# Patient Record
Sex: MALE | Race: BLACK OR AFRICAN AMERICAN | NOT HISPANIC OR LATINO | Employment: FULL TIME | URBAN - METROPOLITAN AREA
[De-identification: names, ages, dates, MRNs, and addresses within clinical notes are randomized per-mention and may not be internally consistent; named-entity substitution may affect disease eponyms.]

---

## 2017-11-20 ENCOUNTER — AMBULATORY - HEALTHEAST (OUTPATIENT)
Dept: LAB | Facility: CLINIC | Age: 37
End: 2017-11-20

## 2017-11-20 DIAGNOSIS — Z31.41 FERTILITY TESTING: ICD-10-CM

## 2017-11-21 ENCOUNTER — RECORDS - HEALTHEAST (OUTPATIENT)
Dept: ADMINISTRATIVE | Facility: OTHER | Age: 37
End: 2017-11-21

## 2022-07-12 ENCOUNTER — OFFICE VISIT (OUTPATIENT)
Dept: FAMILY MEDICINE | Facility: CLINIC | Age: 42
End: 2022-07-12

## 2022-07-12 VITALS
HEART RATE: 87 BPM | SYSTOLIC BLOOD PRESSURE: 132 MMHG | OXYGEN SATURATION: 98 % | WEIGHT: 187 LBS | HEIGHT: 69 IN | TEMPERATURE: 98.7 F | DIASTOLIC BLOOD PRESSURE: 84 MMHG | RESPIRATION RATE: 16 BRPM | BODY MASS INDEX: 27.7 KG/M2

## 2022-07-12 DIAGNOSIS — R51.9 ACUTE NONINTRACTABLE HEADACHE, UNSPECIFIED HEADACHE TYPE: Primary | ICD-10-CM

## 2022-07-12 PROCEDURE — 99203 OFFICE O/P NEW LOW 30 MIN: CPT | Performed by: FAMILY MEDICINE

## 2022-07-12 RX ORDER — MIRTAZAPINE 30 MG/1
TABLET, FILM COATED ORAL
COMMUNITY
Start: 2022-01-06 | End: 2022-07-12

## 2022-07-12 RX ORDER — ASPIRIN 81 MG/1
81 TABLET, CHEWABLE ORAL DAILY
COMMUNITY

## 2022-07-12 RX ORDER — PREDNISONE 20 MG/1
20 TABLET ORAL 2 TIMES DAILY
Qty: 14 TABLET | Refills: 0 | Status: SHIPPED | OUTPATIENT
Start: 2022-07-12 | End: 2022-07-19

## 2022-07-12 RX ORDER — MIRTAZAPINE 45 MG/1
45 TABLET, FILM COATED ORAL AT BEDTIME
COMMUNITY
Start: 2022-03-14

## 2022-07-12 RX ORDER — DICLOFENAC SODIUM 75 MG/1
75 TABLET, DELAYED RELEASE ORAL 2 TIMES DAILY PRN
Qty: 30 TABLET | Refills: 0 | Status: SHIPPED | OUTPATIENT
Start: 2022-07-12 | End: 2024-01-07

## 2022-07-12 ASSESSMENT — PATIENT HEALTH QUESTIONNAIRE - PHQ9
SUM OF ALL RESPONSES TO PHQ QUESTIONS 1-9: 4
10. IF YOU CHECKED OFF ANY PROBLEMS, HOW DIFFICULT HAVE THESE PROBLEMS MADE IT FOR YOU TO DO YOUR WORK, TAKE CARE OF THINGS AT HOME, OR GET ALONG WITH OTHER PEOPLE: VERY DIFFICULT
SUM OF ALL RESPONSES TO PHQ QUESTIONS 1-9: 4

## 2022-07-12 ASSESSMENT — PAIN SCALES - GENERAL: PAINLEVEL: EXTREME PAIN (8)

## 2022-07-12 NOTE — PROGRESS NOTES
"  Assessment & Plan     Acute nonintractable headache, unspecified headache type  His headache actually sounds migrainous to me.  I have suggested treatment as noted below with prednisone and diclofenac given the duration of his headache.  If not improving I did put in a contingent order for an MRI of the brain and information on how to schedule that.  - predniSONE (DELTASONE) 20 MG tablet; Take 1 tablet (20 mg) by mouth 2 times daily for 7 days  - diclofenac (VOLTAREN) 75 MG EC tablet; Take 1 tablet (75 mg) by mouth 2 times daily as needed for moderate pain  - MR Brain w/o Contrast; Future             BMI:   Estimated body mass index is 27.62 kg/m  as calculated from the following:    Height as of this encounter: 1.753 m (5' 9\").    Weight as of this encounter: 84.8 kg (187 lb).           Return in about 4 weeks (around 8/9/2022) for Pain Recheck.    Leo Vasquez MD  [facility] Parker is a 42 year old, presenting for the following health issues:  Headache      History of Present Illness       Headaches:   Since the patient's last clinic visit, headaches are: worsened  The patient is getting headaches:  Everyday  He is not able to do normal daily activities when he has a migraine.  The patient is taking the following rescue/relief medications:  Tylenol and other   Patient states \"I get only a small amount of relief\" from the rescue/relief medications.   The patient is taking the following medications to prevent migraines:  No medications to prevent migraines  In the past 4 weeks, the patient has gone to an Urgent Care or Emergency Room 0 times times due to headaches.    He eats 2-3 servings of fruits and vegetables daily.He consumes 0 sweetened beverage(s) daily.He exercises with enough effort to increase his heart rate 9 or less minutes per day.  He exercises with enough effort to increase his heart rate 3 or less days per week.   He is taking medications " "regularly.    Today's PHQ-9         PHQ-9 Total Score: 4    PHQ-9 Q9 Thoughts of better off dead/self-harm past 2 weeks :   Not at all    How difficult have these problems made it for you to do your work, take care of things at home, or get along with other people: Very difficult       Patient came in today ostensibly for an electrocardiogram.  He had been seen at [facility] last week and showed me some test results including a thyroid function, lipid panel, and comprehensive metabolic panel on his phone and those results were normal.  He had been seen there because of lightheadedness and a headache.  What he describes to me is a bilateral occipital headache that started about 3 weeks ago.  It seems to worsen if he has something to drink and he is wondering if it is causing increased pressure inside his head.  Headache seems to also increase when he gets less sleep and improve with sleep.  Not clearly any photophobia or phonophobia although he has noted some blurriness to his vision.  Never really had a headache like this before but he does get milder headaches when he is not sleeping well.  Milder headaches when he is not sleeping well.  The current headaches been pretty constant over the last 2 to 3 weeks although it has waxed and waned in severity.  No associated nausea or vomiting.  He was thinking he might need an EKG because he was wondering if there was some abnormal dilation to the veins in his neck as well as a connection between fluid intake and the pressure in his head.    Review of Systems   Constitutional, HEENT, cardiovascular, pulmonary, gi and gu systems are negative, except as otherwise noted.      Objective    /84   Pulse 87   Temp 98.7  F (37.1  C) (Temporal)   Resp 16   Ht 1.753 m (5' 9\")   Wt 84.8 kg (187 lb)   SpO2 98%   BMI 27.62 kg/m    Body mass index is 27.62 kg/m .  Physical Exam   GENERAL: healthy, alert and no distress  EYES: Eyes grossly normal to inspection, " PERRL and conjunctivae and sclerae normal  EYES: fundi-sharp optic disks  HENT: ear canals and TM's normal, nose and mouth without ulcers or lesions  HENT: normal cephalic/atraumatic, ear canals and TM's normal, nose and mouth without ulcers or lesions, oropharynx clear, oral mucous membranes moist and no TMJ tenderness and temporal artery pulses are normal bilaterally  NECK: no adenopathy, no asymmetry, masses, or scars and thyroid normal to palpation  RESP: lungs clear to auscultation - no rales, rhonchi or wheezes  CV: regular rate and rhythm, normal S1 S2, no S3 or S4, no murmur, click or rub, no peripheral edema and peripheral pulses strong  MS: no gross musculoskeletal defects noted, no edema  SKIN: no suspicious lesions or rashes  NEURO: Normal strength and tone, mentation intact and speech normal  NEURO: Normal strength and tone, sensory exam grossly normal, mentation intact, speech normal, cranial nerves 2-12 intact, DTR's normal and symmetric 1+, Romberg normal and gait was normal and finger-nose-finger was normal  PSYCH: mentation appears normal, affect normal/bright                    .  ..

## 2022-07-30 ENCOUNTER — NURSE TRIAGE (OUTPATIENT)
Dept: NURSING | Facility: CLINIC | Age: 42
End: 2022-07-30

## 2022-07-30 NOTE — TELEPHONE ENCOUNTER
Keith calling back with questions about the MRI. FNA advised it would be fine to reschedule itbut could also wait and staff may decide to do it this weekend when he's seen. Keith agreed to going in tomorrow.   Patient voiced understanding and will follow disposition.   Kimmie Butterfield, RN  [facility] Nurse Advisor        Reason for Disposition    General information question, no triage required and triager able to answer question    Additional Information    Negative: [1] Caller is not with the adult (patient) AND [2] reporting urgent symptoms    Negative: Lab result questions    Negative: Medication questions    Negative: Caller can't be reached by phone    Negative: Caller has already spoken to PCP or another triager    Negative: RN needs further essential information from caller in order to complete triage    Negative: Requesting regular office appointment    Negative: [1] Caller requesting NON-URGENT health information AND [2] PCP's office is the best resource    Negative: Health Information question, no triage required and triager able to answer question    Protocols used: INFORMATION ONLY CALL - NO TRIAGE-A-

## 2022-07-30 NOTE — TELEPHONE ENCOUNTER
Reason for Disposition    Systolic BP  >= 180 OR Diastolic >= 110    Additional Information    Negative: Difficult to awaken or acting confused (e.g., disoriented, slurred speech)    Negative: Severe difficulty breathing (e.g., struggling for each breath, speaks in single words)    Negative: [1] Weakness of the face, arm or leg on one side of the body AND [2] new onset    Negative: [1] Numbness (i.e., loss of sensation) of the face, arm or leg on one side of the body AND [2] new onset    Negative: [1] Chest pain lasts > 5 minutes AND [2] history of heart disease  (i.e., heart attack, bypass surgery, angina, angioplasty, CHF)    Negative: [1] Chest pain AND [2] took nitrogylcerin AND [3] pain was not relieved    Negative: Sounds like a life-threatening emergency to the triager    Negative: Symptom is main concern  (e.g., headache, chest pain)    Negative: Low blood pressure is main concern    Negative: [1] Systolic BP  >= 160 OR Diastolic >= 100 AND [2] cardiac or neurologic symptoms (e.g., chest pain, difficulty breathing, unsteady gait, blurred vision)    Negative: [1] Pregnant > 20 weeks (or postpartum < 6 weeks) AND [2] new hand or face swelling    Negative: [1] Pregnant > 20 weeks AND [2] BP Systolic BP  >= 140 OR Diastolic >= 90    Negative: [1] Systolic BP  >= 200 OR Diastolic >= 120  AND [2] having NO cardiac or neurologic symptoms    Negative: [1] Postpartum < 6 weeks AND [2] BP Systolic BP  >= 140 OR Diastolic >= 90    Negative: [1] Systolic BP  >= 180 OR Diastolic >= 110 AND [2] missed most recent dose of blood pressure medication    Protocols used: HIGH BLOOD PRESSURE-A-AH

## 2022-07-30 NOTE — TELEPHONE ENCOUNTER
"Patient reports elevated blood pressure at home \"for weeks.\" Current BP is 155/103 with heart rate of 82.  Symptom: has pressure on his head; says it's not pain; feels like there is something there    Patient took ASA and Amlodipine 5 mg  (friend's medication).    Disposition: Urgent Care (be seen w/i 24 hrs).  Informed closest /WIC is [facility]. Patient was given address and open hours.  Advised to call back w/ questions/concerns.  Patient verbalized understanding.        Arielle Doshi RN/[facility]       "

## 2022-07-31 ENCOUNTER — HOSPITAL ENCOUNTER (EMERGENCY)
Facility: CLINIC | Age: 42
Discharge: HOME | End: 2022-07-31
Attending: STUDENT IN AN ORGANIZED HEALTH CARE EDUCATION/TRAINING PROGRAM | Admitting: STUDENT IN AN ORGANIZED HEALTH CARE EDUCATION/TRAINING PROGRAM

## 2022-07-31 VITALS
OXYGEN SATURATION: 98 % | WEIGHT: 187 LBS | SYSTOLIC BLOOD PRESSURE: 113 MMHG | RESPIRATION RATE: 16 BRPM | BODY MASS INDEX: 27.7 KG/M2 | HEIGHT: 69 IN | HEART RATE: 97 BPM | DIASTOLIC BLOOD PRESSURE: 77 MMHG

## 2022-07-31 DIAGNOSIS — G44.209 TENSION HEADACHE: ICD-10-CM

## 2022-07-31 PROCEDURE — 99283 EMERGENCY DEPT VISIT LOW MDM: CPT

## 2022-07-31 RX ORDER — METOCLOPRAMIDE 5 MG/1
5 TABLET ORAL 2 TIMES DAILY PRN
Qty: 10 TABLET | Refills: 0 | Status: SHIPPED | OUTPATIENT
Start: 2022-07-31

## 2022-07-31 NOTE — ED PROVIDER NOTES
Emergency Department Encounter         FINAL IMPRESSION:  Headache, hypertension        ED COURSE AND MEDICAL DECISION MAKING     9:45 AM The medical student met with the patient, obtained patient history, and performed a physical exam.  10:09 AM I introduced myself to the patient, obtained patient history, performed a physical exam, and discussed plan for ED workup including potential diagnostic laboratory/imaging studies and interventions.  10:18 AM We discussed the plan for discharge and the patient is agreeable. Reviewed supportive cares, symptomatic treatment, outpatient follow up, and reasons to return to the Emergency Department. Patient to be discharged by ED RN.      ED Course as of 07/31/22 1032   Sun Jul 31, 2022   1023 Patient is a 42-year-old male healthy with no chronic medical problems here with high blood pressure and headaches.  Patient was seen by his primary care doctor earlier this month and was prescribed prednisone and diclofenac for headaches.  Was told to schedule MRI.  He patient states he did schedule an MRI however did not follow-up and was unable to go to the appointment.  Presents now with a systolic blood pressure of 150 at home with a posterior occipital headache that is been constant for the past month or so.  No fevers, chills, nausea, vomiting, vision issues, vertigo, chest pain, trouble breathing, abdominal pain, difficulty walking or imbalance issues.  No speech difficulties.  No weakness or paresthesias.  On arrival his blood pressure is 139/82.  He looks well clinically.  NIH of 0.  He states he has an occipital headache.  No vision issues.  No neck pain.  He looks well clinically.  Heart and lungs are normal.  Neurologically intact.  Seems to be tension type as patient states the headache is worse when he lacks sleep.  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  Denies any SI or HI however states he has struggled with depression  anxiety at times.  Takes Remeron for sleep.  Recommended conservative care measures at home including Tylenol, heat/ice on his neck, ibuprofen and primary care follow-up.  I also think an outpatient MRI is reasonable.  Encourage patient to schedule this.  Otherwise to continue to monitor his blood pressures.  Follow-up with primary care next week for blood pressure check.                                              At the conclusion of the encounter I discussed the results of all the tests and the disposition. The questions were answered. The patient or family acknowledged understanding and was agreeable with the care plan.                  MEDICATIONS GIVEN IN THE EMERGENCY DEPARTMENT:  Medications - No data to display    NEW PRESCRIPTIONS STARTED AT TODAY'S ED VISIT:  New Prescriptions    DIPHENHYDRAMINE (BENADRYL) 25 MG TABLET    Take 1 tablet (25 mg) by mouth nightly as needed (headache)    METOCLOPRAMIDE (REGLAN) 5 MG TABLET    Take 1 tablet (5 mg) by mouth 2 times daily as needed (headache, nausea)       HPI     Patient information obtained from: Patient    Use of Interpretor: N/A     Keith Alanis is a 42 year old male with a pertinent history of constipation and insomnia who presents to this ED by walk in for evaluation of headache and hypertension.    The patient reports over the last month he has noticed his blood pressure has been higher than normal and he has been experiencing occipital headaches he describes as pressure-like. He had one reading in the 190s systolic, one in the 160s systolic, but most frequently in the 130s-150s. Yesterday morning his systolic blood pressure was in the 150s, he took aspirin and one of his father's prescribed amlodipine (5 mg) and his systolic blood pressure decreased to the 140s. His headaches typically last 2 hours and are sometimes improved with Tylenol and water. He has had some blurry vision since he started getting headaches. He has followed with his primary,  "and was scheduled for an MRI, but was unable to go to the appointment last week.    The patient called the nurse triage line yesterday with concerns about his elevated blood pressure readings and was advised to be seen in urgent care within the next 24 hours.    He currently endorses dizziness and neck pain. He denies nausea, abdominal pain, cough, fever, hearing changes, and any other complaints at this time.    REVIEW OF SYSTEMS:  Review of Systems   Constitutional: Negative for fever, malaise  HEENT: Negative runny nose, sore throat, ear pain, neck pain, hearing changes. Positive for blurry vision.  Respiratory: Negative for shortness of breath, cough, congestion  Cardiovascular: Negative for chest pain, leg edema  Gastrointestinal: Negative for abdominal distention, abdominal pain, constipation, vomiting, nausea, diarrhea  Genitourinary: Negative for dysuria and hematuria.   Integument: Negative for rash, skin breakdown  Neurological: Negative for paresthesias, weakness. Positive for headache, dizziness.  Musculoskeletal: Negative for joint pain, joint swelling. Positive for neck pain.      All other systems reviewed and are negative.          MEDICAL HISTORY     History reviewed. No pertinent past medical history.    Past Surgical History:   Procedure Laterality Date     NO HISTORY OF SURGERY         Social History     Tobacco Use     Smoking status: Never Smoker     Smokeless tobacco: Never Used   Substance Use Topics     Alcohol use: Not Currently     Drug use: Not Currently       diphenhydrAMINE (BENADRYL) 25 MG tablet  metoclopramide (REGLAN) 5 MG tablet  Acetaminophen (TYLENOL PO)  aspirin (ASA) 81 MG chewable tablet  diclofenac (VOLTAREN) 75 MG EC tablet  mirtazapine (REMERON) 45 MG tablet            PHYSICAL EXAM     /82   Pulse 97   Resp 16   Ht 1.753 m (5' 9\")   Wt 84.8 kg (187 lb)   SpO2 100%   BMI 27.62 kg/m        PHYSICAL EXAM:     General: Patient appears well, nontoxic, " comfortable  HEENT: Moist mucous membranes, no tongue swelling.  No head trauma.  No midline neck pain.  Cardiovascular: Normal rate, normal rhythm, no extremity edema.  No appreciable murmur.  Respiratory: No signs of respiratory distress, lungs are clear to auscultation bilaterally with no wheezes rhonchi or rales.  Abdominal: Soft, nontender, nondistended, no palpable masses, no guarding, no rebound  Musculoskeletal: Full range of motion of joints, no deformities appreciated.  Neurological: Alert and oriented, grossly neurologically intact.  Alert and oriented, +5 strength UE/LE, normal finger to nose, , gross sensation intact throughout, CN II-12 intact grossly, no difficulty with ambulation, no slurring of words, no word finding difficulty    Psychological: Normal affect and mood.  Integument: No rashes appreciated    [employer] Stroke Scale  Exam Interval: Baseline   Score    Level of consciousness: (0)   Alert, keenly responsive    LOC questions: (0)   Answers both questions correctly    LOC commands: (0)   Performs both tasks correctly    Best gaze: (0)   Normal    Visual: (0)   No visual loss    Facial palsy: (0)   Normal symmetrical movements    Motor arm (left): (0)   No drift    Motor arm (right): (0)   No drift    Motor leg (left): (0)   No drift    Motor leg (right): (0)   No drift    Limb ataxia: (0)   Absent    Sensory: (0)   Normal- no sensory loss    Best language: (0)   Normal- no aphasia    Dysarthria: (0)   Normal    Extinction and inattention: (0)   No abnormality        Total Score:  0           RESULTS       Labs Ordered and Resulted from Time of ED Arrival to Time of ED Departure - No data to display    No orders to display                     PROCEDURES:  Procedures:  Procedures       Kayode VANG am serving as a scribe to document services personally performed by Miko Ely DO, based on my observations and the provider's statements to me.  Miko VANG DO, attest  that Kayode Gomez is acting in a scribe capacity, has observed my performance of the services and has documented them in accordance with my direction.    Miko Ely DO  Emergency Medicine  [facility] EMERGENCY ROOM     Ohl, Miko Diehl DO  07/31/22 1032

## 2022-07-31 NOTE — ED TRIAGE NOTES
Patient presents tot he ED with complaints of high blood pressure(155/103) and headaches. He reports currently has a slight headache.

## 2022-09-25 ENCOUNTER — HEALTH MAINTENANCE LETTER (OUTPATIENT)
Age: 42
End: 2022-09-25

## 2023-10-14 ENCOUNTER — HEALTH MAINTENANCE LETTER (OUTPATIENT)
Age: 43
End: 2023-10-14

## 2024-01-07 DIAGNOSIS — R51.9 ACUTE NONINTRACTABLE HEADACHE, UNSPECIFIED HEADACHE TYPE: ICD-10-CM

## 2024-01-07 RX ORDER — DICLOFENAC SODIUM 75 MG/1
75 TABLET, DELAYED RELEASE ORAL 2 TIMES DAILY PRN
Qty: 30 TABLET | Refills: 0 | Status: SHIPPED | OUTPATIENT
Start: 2024-01-07

## 2024-12-07 ENCOUNTER — HEALTH MAINTENANCE LETTER (OUTPATIENT)
Age: 44
End: 2024-12-07

## 2025-08-26 ENCOUNTER — OFFICE VISIT (OUTPATIENT)
Dept: FAMILY MEDICINE | Facility: CLINIC | Age: 45
End: 2025-08-26
Payer: COMMERCIAL

## 2025-08-26 VITALS
SYSTOLIC BLOOD PRESSURE: 101 MMHG | HEIGHT: 71 IN | OXYGEN SATURATION: 97 % | TEMPERATURE: 98 F | WEIGHT: 194.1 LBS | DIASTOLIC BLOOD PRESSURE: 67 MMHG | BODY MASS INDEX: 27.17 KG/M2 | HEART RATE: 66 BPM | RESPIRATION RATE: 16 BRPM

## 2025-08-26 DIAGNOSIS — R51.9 ACUTE NONINTRACTABLE HEADACHE, UNSPECIFIED HEADACHE TYPE: Primary | ICD-10-CM

## 2025-08-26 DIAGNOSIS — R53.83 FATIGUE, UNSPECIFIED TYPE: ICD-10-CM

## 2025-08-26 DIAGNOSIS — R51.9 CHRONIC DAILY HEADACHE: ICD-10-CM

## 2025-08-26 DIAGNOSIS — F51.01 PRIMARY INSOMNIA: ICD-10-CM

## 2025-08-26 DIAGNOSIS — E55.9 VITAMIN D DEFICIENCY: ICD-10-CM

## 2025-08-26 PROBLEM — D69.6 THROMBOCYTOPENIC DISORDER: Status: ACTIVE | Noted: 2025-08-26

## 2025-08-26 PROBLEM — S89.92XA INJURY OF LEFT LOWER EXTREMITY: Status: ACTIVE | Noted: 2025-08-26

## 2025-08-26 PROBLEM — R79.89 ABNORMAL SERUM THYROID STIMULATING HORMONE (TSH) LEVEL: Status: ACTIVE | Noted: 2025-08-26

## 2025-08-26 PROBLEM — N46.9 MALE INFERTILITY: Status: ACTIVE | Noted: 2025-08-26

## 2025-08-26 LAB
ANION GAP SERPL CALCULATED.3IONS-SCNC: 11 MMOL/L (ref 7–15)
BUN SERPL-MCNC: 11.3 MG/DL (ref 6–20)
CALCIUM SERPL-MCNC: 9.2 MG/DL (ref 8.8–10.4)
CHLORIDE SERPL-SCNC: 101 MMOL/L (ref 98–107)
CREAT SERPL-MCNC: 1.38 MG/DL (ref 0.67–1.17)
EGFRCR SERPLBLD CKD-EPI 2021: 64 ML/MIN/1.73M2
ERYTHROCYTE [DISTWIDTH] IN BLOOD BY AUTOMATED COUNT: 13.7 % (ref 10–15)
EST. AVERAGE GLUCOSE BLD GHB EST-MCNC: 126 MG/DL
GLUCOSE SERPL-MCNC: 84 MG/DL (ref 70–99)
HBA1C MFR BLD: 6 % (ref 0–5.6)
HCO3 SERPL-SCNC: 25 MMOL/L (ref 22–29)
HCT VFR BLD AUTO: 45.7 % (ref 40–53)
HGB BLD-MCNC: 14.6 G/DL (ref 13.3–17.7)
MAGNESIUM SERPL-MCNC: 2.4 MG/DL (ref 1.7–2.3)
MCH RBC QN AUTO: 29.8 PG (ref 26.5–33)
MCHC RBC AUTO-ENTMCNC: 31.9 G/DL (ref 31.5–36.5)
MCV RBC AUTO: 93.3 FL (ref 78–100)
PLATELET # BLD AUTO: 116 10E3/UL (ref 150–450)
POTASSIUM SERPL-SCNC: 4.1 MMOL/L (ref 3.4–5.3)
RBC # BLD AUTO: 4.9 10E6/UL (ref 4.4–5.9)
SODIUM SERPL-SCNC: 137 MMOL/L (ref 135–145)
VIT D+METAB SERPL-MCNC: 31 NG/ML (ref 20–50)
WBC # BLD AUTO: 4.14 10E3/UL (ref 4–11)

## 2025-08-26 PROCEDURE — 36415 COLL VENOUS BLD VENIPUNCTURE: CPT | Performed by: NURSE PRACTITIONER

## 2025-08-26 PROCEDURE — 3078F DIAST BP <80 MM HG: CPT | Performed by: NURSE PRACTITIONER

## 2025-08-26 PROCEDURE — 3074F SYST BP LT 130 MM HG: CPT | Performed by: NURSE PRACTITIONER

## 2025-08-26 PROCEDURE — 3044F HG A1C LEVEL LT 7.0%: CPT | Performed by: NURSE PRACTITIONER

## 2025-08-26 PROCEDURE — 84403 ASSAY OF TOTAL TESTOSTERONE: CPT | Performed by: NURSE PRACTITIONER

## 2025-08-26 PROCEDURE — 1125F AMNT PAIN NOTED PAIN PRSNT: CPT | Performed by: NURSE PRACTITIONER

## 2025-08-26 PROCEDURE — 82306 VITAMIN D 25 HYDROXY: CPT | Performed by: NURSE PRACTITIONER

## 2025-08-26 PROCEDURE — 80048 BASIC METABOLIC PNL TOTAL CA: CPT | Performed by: NURSE PRACTITIONER

## 2025-08-26 PROCEDURE — 83036 HEMOGLOBIN GLYCOSYLATED A1C: CPT | Performed by: NURSE PRACTITIONER

## 2025-08-26 PROCEDURE — 83735 ASSAY OF MAGNESIUM: CPT | Performed by: NURSE PRACTITIONER

## 2025-08-26 PROCEDURE — 99204 OFFICE O/P NEW MOD 45 MIN: CPT | Performed by: NURSE PRACTITIONER

## 2025-08-26 PROCEDURE — 85027 COMPLETE CBC AUTOMATED: CPT | Performed by: NURSE PRACTITIONER

## 2025-08-26 RX ORDER — DICLOFENAC SODIUM 75 MG/1
75 TABLET, DELAYED RELEASE ORAL 2 TIMES DAILY PRN
Qty: 60 TABLET | Refills: 1 | Status: SHIPPED | OUTPATIENT
Start: 2025-08-26

## 2025-08-26 RX ORDER — MIRTAZAPINE 45 MG/1
45 TABLET, FILM COATED ORAL AT BEDTIME
Qty: 90 TABLET | Refills: 1 | Status: SHIPPED | OUTPATIENT
Start: 2025-08-26

## 2025-08-26 ASSESSMENT — ENCOUNTER SYMPTOMS: HEADACHES: 1

## 2025-08-26 ASSESSMENT — PAIN SCALES - GENERAL: PAINLEVEL_OUTOF10: SEVERE PAIN (7)

## 2025-08-27 ENCOUNTER — PATIENT OUTREACH (OUTPATIENT)
Dept: CARE COORDINATION | Facility: CLINIC | Age: 45
End: 2025-08-27
Payer: COMMERCIAL

## 2025-08-28 LAB — TESTOST SERPL-MCNC: 262 NG/DL (ref 240–950)

## 2025-08-29 ENCOUNTER — RESULTS FOLLOW-UP (OUTPATIENT)
Dept: FAMILY MEDICINE | Facility: CLINIC | Age: 45
End: 2025-08-29
Payer: COMMERCIAL

## 2025-08-29 DIAGNOSIS — R79.89 ELEVATED SERUM CREATININE: Primary | ICD-10-CM

## 2025-08-29 DIAGNOSIS — R79.89 HIGH SERUM MAGNESIUM: ICD-10-CM

## 2025-08-29 DIAGNOSIS — D69.6 THROMBOCYTOPENIA: ICD-10-CM
